# Patient Record
Sex: FEMALE | Race: BLACK OR AFRICAN AMERICAN | NOT HISPANIC OR LATINO
[De-identification: names, ages, dates, MRNs, and addresses within clinical notes are randomized per-mention and may not be internally consistent; named-entity substitution may affect disease eponyms.]

---

## 2018-07-23 ENCOUNTER — RESULT REVIEW (OUTPATIENT)
Age: 58
End: 2018-07-23

## 2019-01-27 ENCOUNTER — RESULT REVIEW (OUTPATIENT)
Age: 59
End: 2019-01-27

## 2019-01-28 ENCOUNTER — RESULT REVIEW (OUTPATIENT)
Age: 59
End: 2019-01-28

## 2019-07-29 ENCOUNTER — RESULT REVIEW (OUTPATIENT)
Age: 59
End: 2019-07-29

## 2020-01-02 ENCOUNTER — TRANSCRIPTION ENCOUNTER (OUTPATIENT)
Age: 60
End: 2020-01-02

## 2020-01-03 ENCOUNTER — APPOINTMENT (OUTPATIENT)
Dept: ORTHOPEDIC SURGERY | Facility: CLINIC | Age: 60
End: 2020-01-03
Payer: COMMERCIAL

## 2020-01-03 VITALS — HEIGHT: 63 IN | WEIGHT: 138 LBS | BODY MASS INDEX: 24.45 KG/M2

## 2020-01-03 DIAGNOSIS — M25.562 PAIN IN LEFT KNEE: ICD-10-CM

## 2020-01-03 DIAGNOSIS — M23.307 OTHER MENISCUS DERANGEMENTS, UNSPECIFIED MENISCUS, LEFT KNEE: ICD-10-CM

## 2020-01-03 DIAGNOSIS — M17.12 UNILATERAL PRIMARY OSTEOARTHRITIS, LEFT KNEE: ICD-10-CM

## 2020-01-03 DIAGNOSIS — M25.462 EFFUSION, LEFT KNEE: ICD-10-CM

## 2020-01-03 PROCEDURE — 20610 DRAIN/INJ JOINT/BURSA W/O US: CPT | Mod: LT

## 2020-01-03 PROCEDURE — 73562 X-RAY EXAM OF KNEE 3: CPT | Mod: LT

## 2020-01-03 PROCEDURE — 99204 OFFICE O/P NEW MOD 45 MIN: CPT | Mod: 25

## 2020-01-04 ENCOUNTER — INPATIENT (INPATIENT)
Facility: HOSPITAL | Age: 60
LOS: 0 days | Discharge: HOME | End: 2020-01-05
Attending: INTERNAL MEDICINE | Admitting: INTERNAL MEDICINE
Payer: COMMERCIAL

## 2020-01-04 VITALS
RESPIRATION RATE: 20 BRPM | DIASTOLIC BLOOD PRESSURE: 84 MMHG | WEIGHT: 139.99 LBS | OXYGEN SATURATION: 100 % | TEMPERATURE: 98 F | HEART RATE: 85 BPM | HEIGHT: 63 IN | SYSTOLIC BLOOD PRESSURE: 135 MMHG

## 2020-01-04 DIAGNOSIS — M19.91 PRIMARY OSTEOARTHRITIS, UNSPECIFIED SITE: ICD-10-CM

## 2020-01-04 DIAGNOSIS — R79.89 OTHER SPECIFIED ABNORMAL FINDINGS OF BLOOD CHEMISTRY: ICD-10-CM

## 2020-01-04 DIAGNOSIS — I20.0 UNSTABLE ANGINA: ICD-10-CM

## 2020-01-04 DIAGNOSIS — R42 DIZZINESS AND GIDDINESS: ICD-10-CM

## 2020-01-04 DIAGNOSIS — Z29.9 ENCOUNTER FOR PROPHYLACTIC MEASURES, UNSPECIFIED: ICD-10-CM

## 2020-01-04 LAB
ALBUMIN SERPL ELPH-MCNC: 3.8 G/DL — SIGNIFICANT CHANGE UP (ref 3.3–5)
ALP SERPL-CCNC: 101 U/L — SIGNIFICANT CHANGE UP (ref 40–120)
ALT FLD-CCNC: 22 U/L — SIGNIFICANT CHANGE UP (ref 12–78)
ANION GAP SERPL CALC-SCNC: 8 MMOL/L — SIGNIFICANT CHANGE UP (ref 5–17)
APTT BLD: 23.7 SEC — LOW (ref 27.5–36.3)
AST SERPL-CCNC: 18 U/L — SIGNIFICANT CHANGE UP (ref 15–37)
BILIRUB SERPL-MCNC: 0.2 MG/DL — SIGNIFICANT CHANGE UP (ref 0.2–1.2)
BUN SERPL-MCNC: 17 MG/DL — SIGNIFICANT CHANGE UP (ref 7–23)
CALCIUM SERPL-MCNC: 9.9 MG/DL — SIGNIFICANT CHANGE UP (ref 8.5–10.1)
CHLORIDE SERPL-SCNC: 105 MMOL/L — SIGNIFICANT CHANGE UP (ref 96–108)
CHOLEST SERPL-MCNC: 226 MG/DL — HIGH (ref 10–199)
CO2 SERPL-SCNC: 26 MMOL/L — SIGNIFICANT CHANGE UP (ref 22–31)
CREAT SERPL-MCNC: 1.03 MG/DL — SIGNIFICANT CHANGE UP (ref 0.5–1.3)
D DIMER BLD IA.RAPID-MCNC: <150 NG/ML DDU — SIGNIFICANT CHANGE UP
GLUCOSE SERPL-MCNC: 151 MG/DL — HIGH (ref 70–99)
HCT VFR BLD CALC: 38.4 % — SIGNIFICANT CHANGE UP (ref 34.5–45)
HDLC SERPL-MCNC: 96 MG/DL — SIGNIFICANT CHANGE UP
HGB BLD-MCNC: 12.7 G/DL — SIGNIFICANT CHANGE UP (ref 11.5–15.5)
INR BLD: 1 RATIO — SIGNIFICANT CHANGE UP (ref 0.88–1.16)
LIPID PNL WITH DIRECT LDL SERPL: 123 MG/DL — HIGH
MCHC RBC-ENTMCNC: 29.1 PG — SIGNIFICANT CHANGE UP (ref 27–34)
MCHC RBC-ENTMCNC: 33.1 GM/DL — SIGNIFICANT CHANGE UP (ref 32–36)
MCV RBC AUTO: 87.9 FL — SIGNIFICANT CHANGE UP (ref 80–100)
NRBC # BLD: 0 /100 WBCS — SIGNIFICANT CHANGE UP (ref 0–0)
NT-PROBNP SERPL-SCNC: 72 PG/ML — SIGNIFICANT CHANGE UP (ref 0–125)
PLATELET # BLD AUTO: 210 K/UL — SIGNIFICANT CHANGE UP (ref 150–400)
POTASSIUM SERPL-MCNC: 3.9 MMOL/L — SIGNIFICANT CHANGE UP (ref 3.5–5.3)
POTASSIUM SERPL-SCNC: 3.9 MMOL/L — SIGNIFICANT CHANGE UP (ref 3.5–5.3)
PROT SERPL-MCNC: 7.8 GM/DL — SIGNIFICANT CHANGE UP (ref 6–8.3)
PROTHROM AB SERPL-ACNC: 11.2 SEC — SIGNIFICANT CHANGE UP (ref 10–12.9)
RBC # BLD: 4.37 M/UL — SIGNIFICANT CHANGE UP (ref 3.8–5.2)
RBC # FLD: 14.5 % — SIGNIFICANT CHANGE UP (ref 10.3–14.5)
SODIUM SERPL-SCNC: 139 MMOL/L — SIGNIFICANT CHANGE UP (ref 135–145)
TOTAL CHOLESTEROL/HDL RATIO MEASUREMENT: 2.4 RATIO — LOW (ref 3.3–7.1)
TRIGL SERPL-MCNC: 36 MG/DL — SIGNIFICANT CHANGE UP (ref 10–149)
TROPONIN I SERPL-MCNC: 0.03 NG/ML — SIGNIFICANT CHANGE UP (ref 0.01–0.04)
TROPONIN I SERPL-MCNC: 0.05 NG/ML — HIGH (ref 0.01–0.04)
TROPONIN I SERPL-MCNC: 0.11 NG/ML — HIGH (ref 0.01–0.04)
WBC # BLD: 9.65 K/UL — SIGNIFICANT CHANGE UP (ref 3.8–10.5)
WBC # FLD AUTO: 9.65 K/UL — SIGNIFICANT CHANGE UP (ref 3.8–10.5)

## 2020-01-04 PROCEDURE — 99222 1ST HOSP IP/OBS MODERATE 55: CPT

## 2020-01-04 PROCEDURE — 99356: CPT

## 2020-01-04 PROCEDURE — 71045 X-RAY EXAM CHEST 1 VIEW: CPT | Mod: 26

## 2020-01-04 PROCEDURE — 93010 ELECTROCARDIOGRAM REPORT: CPT

## 2020-01-04 PROCEDURE — 99285 EMERGENCY DEPT VISIT HI MDM: CPT

## 2020-01-04 PROCEDURE — 99221 1ST HOSP IP/OBS SF/LOW 40: CPT

## 2020-01-04 RX ORDER — AMLODIPINE BESYLATE 2.5 MG/1
10 TABLET ORAL DAILY
Refills: 0 | Status: DISCONTINUED | OUTPATIENT
Start: 2020-01-04 | End: 2020-01-04

## 2020-01-04 RX ORDER — CHOLECALCIFEROL (VITAMIN D3) 125 MCG
1000 CAPSULE ORAL DAILY
Refills: 0 | Status: DISCONTINUED | OUTPATIENT
Start: 2020-01-04 | End: 2020-01-05

## 2020-01-04 RX ORDER — ENOXAPARIN SODIUM 100 MG/ML
40 INJECTION SUBCUTANEOUS DAILY
Refills: 0 | Status: DISCONTINUED | OUTPATIENT
Start: 2020-01-04 | End: 2020-01-04

## 2020-01-04 RX ORDER — MECLIZINE HCL 12.5 MG
12.5 TABLET ORAL EVERY 12 HOURS
Refills: 0 | Status: DISCONTINUED | OUTPATIENT
Start: 2020-01-04 | End: 2020-01-05

## 2020-01-04 RX ORDER — NITROGLYCERIN 6.5 MG
0.4 CAPSULE, EXTENDED RELEASE ORAL
Refills: 0 | Status: DISCONTINUED | OUTPATIENT
Start: 2020-01-04 | End: 2020-01-05

## 2020-01-04 RX ORDER — AMLODIPINE BESYLATE 2.5 MG/1
2.5 TABLET ORAL DAILY
Refills: 0 | Status: DISCONTINUED | OUTPATIENT
Start: 2020-01-04 | End: 2020-01-05

## 2020-01-04 RX ORDER — TRAMADOL HYDROCHLORIDE 50 MG/1
25 TABLET ORAL EVERY 8 HOURS
Refills: 0 | Status: DISCONTINUED | OUTPATIENT
Start: 2020-01-04 | End: 2020-01-05

## 2020-01-04 RX ORDER — ATORVASTATIN CALCIUM 80 MG/1
80 TABLET, FILM COATED ORAL AT BEDTIME
Refills: 0 | Status: DISCONTINUED | OUTPATIENT
Start: 2020-01-04 | End: 2020-01-05

## 2020-01-04 RX ORDER — ENOXAPARIN SODIUM 100 MG/ML
65 INJECTION SUBCUTANEOUS EVERY 12 HOURS
Refills: 0 | Status: DISCONTINUED | OUTPATIENT
Start: 2020-01-04 | End: 2020-01-05

## 2020-01-04 RX ORDER — ASPIRIN/CALCIUM CARB/MAGNESIUM 324 MG
81 TABLET ORAL DAILY
Refills: 0 | Status: DISCONTINUED | OUTPATIENT
Start: 2020-01-05 | End: 2020-01-05

## 2020-01-04 RX ADMIN — ENOXAPARIN SODIUM 65 MILLIGRAM(S): 100 INJECTION SUBCUTANEOUS at 19:28

## 2020-01-04 RX ADMIN — Medication 1000 UNIT(S): at 11:42

## 2020-01-04 RX ADMIN — AMLODIPINE BESYLATE 10 MILLIGRAM(S): 2.5 TABLET ORAL at 06:24

## 2020-01-04 RX ADMIN — ATORVASTATIN CALCIUM 80 MILLIGRAM(S): 80 TABLET, FILM COATED ORAL at 21:17

## 2020-01-04 NOTE — PROGRESS NOTE ADULT - SUBJECTIVE AND OBJECTIVE BOX
Patient is a 59y old  Female who presents with a chief complaint of chest pain, found to have elevated troponin.  I was asked by RN to re-evaluate patient for abnormal troponin level after admission.    INTERVAL HPI/OVERNIGHT EVENTS:  No further chest pain    MEDICATIONS  (STANDING):  amLODIPine   Tablet 2.5 milliGRAM(s) Oral daily  atorvastatin 80 milliGRAM(s) Oral at bedtime  cholecalciferol 1000 Unit(s) Oral daily  enoxaparin Injectable 65 milliGRAM(s) SubCutaneous every 12 hours    MEDICATIONS  (PRN):  meclizine 12.5 milliGRAM(s) Oral every 12 hours PRN Dizziness  nitroglycerin     SubLingual 0.4 milliGRAM(s) SubLingual every 5 minutes PRN Chest Pain  traMADol 25 milliGRAM(s) Oral every 8 hours PRN Moderate Pain (4 - 6)    Allergies:  No Known Allergies    REVIEW OF SYSTEMS:  All other systems reviewed and are negative    Vital Signs Last 24 Hrs  T(C): 36.6 (04 Jan 2020 08:31), Max: 36.6 (04 Jan 2020 08:31)  T(F): 97.9 (04 Jan 2020 08:31), Max: 97.9 (04 Jan 2020 08:31)  HR: 88 (04 Jan 2020 08:31) (85 - 97)  BP: 117/71 (04 Jan 2020 08:31) (117/71 - 135/84)  BP(mean): --  RR: 18 (04 Jan 2020 08:31) (15 - 20)  SpO2: 98% (04 Jan 2020 08:31) (97% - 100%)  Daily Height in cm: 160.02 (04 Jan 2020 02:45)    Daily   I&O's Summary    PHYSICAL EXAM:  GENERAL: NAD, well-groomed, well-developed  HEAD:  Atraumatic, Normocephalic  EYES: EOMI, PERRLA, conjunctiva and sclera clear  ENMT: No tonsillar erythema, exudates, or enlargement; Moist mucous membranes, Good dentition, No lesions  NECK: Supple, No JVD, Normal thyroid  NERVOUS SYSTEM:  Alert & Oriented X3, Good concentration; Motor Strength 5/5 B/L upper and lower extremities; DTRs 2+ intact and symmetric  CHEST/LUNG: Clear to percussion bilaterally; No rales, rhonchi, wheezing, or rubs  HEART: Regular rate and rhythm; No murmurs, rubs, or gallops  ABDOMEN: Soft, Nontender, Nondistended; Bowel sounds present  EXTREMITIES:  2+ Peripheral Pulses, No clubbing, cyanosis, or edema  LYMPH: No lymphadenopathy noted  SKIN: No rashes or lesions    Labs                      12.7   9.65  )-----------( 210      (04 Jan 2020 03:59)             38.4     01-04    139  |  105  |  17  ----------------------------<  151<H>  3.9   |  26  |  1.03    Ca    9.9      04 Jan 2020 03:59    TPro  7.8  /  Alb  3.8  /  TBili  0.2  /  DBili  x   /  AST  18  /  ALT  22  /  AlkPhos  101  01-04    PT/INR - ( 04 Jan 2020 03:59 )   PT: 11.2 sec;   INR: 1.00 ratio    PTT - ( 04 Jan 2020 03:59 )  PTT:23.7 sec    CARDIAC MARKERS ( 04 Jan 2020 09:44 )  .113 ng/mL / x     / x     / x     / x      CARDIAC MARKERS ( 04 Jan 2020 03:59 )  .048 ng/mL / x     / x     / x     / x        < from: Xray Chest 1 View-PORTABLE IMMEDIATE (01.04.20 @ 03:34) >  IMPRESSION:    Mild cardiomegaly. No acute infiltrate.        DVT prophylaxis: > Lovenox

## 2020-01-04 NOTE — H&P ADULT - HISTORY OF PRESENT ILLNESS
Patient is a 58 YO F with a PMH of HTN, vertigo, knee OA who presents to the ED s/p one episode of CP.  Patient reports she was lying down when she developed substernal nonradiating CP, 10/10 associated with shortness of breath and dizziness.  Patient denies hx of nausea, vomiting, palpitations, diaphoresis, or syncope.  Patient reports that she took 2 baby asa which did not help the pain.  EMS arrived and gave her 3 more ASA and the pain resolved after 15 minutes.  Patient reports no further chest pain and has no current complaints.  On ROS, patient reports dizziness - takes meclizine PRN and knee OA - tramadol PRN.  Nonsmoker, NKDA, nondrinker.

## 2020-01-04 NOTE — PROGRESS NOTE ADULT - ASSESSMENT
59F with severe substernal cp relieved with aspirin.  Labs show mild troponin leak.  EKG nsr at 65 with no acute changes.

## 2020-01-04 NOTE — ED ADULT TRIAGE NOTE - CHIEF COMPLAINT QUOTE
BIBA C/O Chest pain , midle of the chest, radiating to mid back , onset 0200 am today .   PMH :Asthma; Vertigo; HTN , Herniated disc

## 2020-01-04 NOTE — H&P ADULT - NSHPLABSRESULTS_GEN_ALL_CORE
Recent Vitals  T(C): 36.4 (01-04-20 @ 02:45), Max: 36.4 (01-04-20 @ 02:45)  HR: 85 (01-04-20 @ 02:45) (85 - 85)  BP: 135/84 (01-04-20 @ 02:45) (135/84 - 135/84)  RR: 20 (01-04-20 @ 02:45) (20 - 20)  SpO2: 100% (01-04-20 @ 02:45) (100% - 100%)                        12.7   9.65  )-----------( 210      ( 04 Jan 2020 03:59 )             38.4     01-04    139  |  105  |  17  ----------------------------<  151<H>  3.9   |  26  |  1.03    Ca    9.9      04 Jan 2020 03:59    TPro  7.8  /  Alb  3.8  /  TBili  0.2  /  DBili  x   /  AST  18  /  ALT  22  /  AlkPhos  101  01-04    PT/INR - ( 04 Jan 2020 03:59 )   PT: 11.2 sec;   INR: 1.00 ratio         PTT - ( 04 Jan 2020 03:59 )  PTT:23.7 sec  LIVER FUNCTIONS - ( 04 Jan 2020 03:59 )  Alb: 3.8 g/dL / Pro: 7.8 gm/dL / ALK PHOS: 101 U/L / ALT: 22 U/L / AST: 18 U/L / GGT: x               Home Medications:  Norvasc 2.5 mg oral tablet: 1 tab(s) orally once a day (13 Feb 2014 16:53)

## 2020-01-04 NOTE — H&P ADULT - ASSESSMENT
59F with severe substernal cp relieved with aspirin.  Labs show mild troponin leak.  EKG nsr at 65 with no acute changes.  Will admit to tele for troponin trending, cardio eval.    IMPROVE VTE Individual Risk Assessment          RISK                                                          Points  [  ] Previous VTE                                                3  [  ] Thrombophilia                                             2  [  ] Lower limb paralysis                                    2        (unable to hold up >15 seconds)    [  ] Current Cancer                                             2         (within 6 months)  [  ] Immobilization > 24 hrs                              1  [  ] ICU/CCU stay > 24 hours                            1  [  ] Age > 60                                                    1    IMPROVE VTE Score - 1

## 2020-01-04 NOTE — H&P ADULT - NSHPPHYSICALEXAM_GEN_ALL_CORE
Physical exam:  General: patient in no acute distress, resting comfortably  Cardio: S1/S2 +ve, regular rate and rhythm, no M/G/R  Resp: clear to ausculation bilaterally, no rales or wheezes  GI: abdomen soft, nontender, non distended, no guarding, BS +ve x 4  Ext: no significant pedal edema  Neuro: CN 2-12 intact, no significant motor or sensory deficits.

## 2020-01-04 NOTE — ED PROVIDER NOTE - OBJECTIVE STATEMENT
Pt is a 58 yo lady with a pmhx of HTN, HL, asthma who presents to the ED with chest pain. It started around 2 AM, was sharp, and is improving now. No cough, no fevers. No abdominal pain. Pain went to her back. No weakness, no sob, no pleuritic pain. No hx of dvt/pe. Pain is improving now. Has had stress test several years ago which was negative. Pt is a 60 yo lady with a pmhx of HTN, HL, asthma who presents to the ED with chest pain. It started around 2 AM, was sharp, and is improving now. No cough, no fevers. No abdominal pain. Pain went to her back. No weakness, no sob, no pleuritic pain. No hx of dvt/pe. Pain is improving now. Has had stress test several years ago which was negative. Pt took 2 81 mg aspirin, and was given 3 more by EMS, and pain resolved.

## 2020-01-04 NOTE — ED PROVIDER NOTE - CLINICAL SUMMARY MEDICAL DECISION MAKING FREE TEXT BOX
Ddx: Acs, heart score 2/ no tearing pain, has equal bp bilaterally, does not suggest dissection/ no sob to suggest PE, perc negative  Plan: Cbc, cmp, bnp, ddimer, cxr, ecg, pt declines pain meds at this time.

## 2020-01-04 NOTE — PROGRESS NOTE ADULT - PROBLEM SELECTOR PLAN 1
Nitroglycerine 0.4mg po q5min x 3 doses maximum PRN for recurrent chest pain  Start Lipitor 80 mg qhs  Start Lovenox 65 mg q12h  Check lipid panel  Repeat Troponin in 6 hours  Cardio eval pending  Echo ordered

## 2020-01-04 NOTE — ED ADULT NURSE NOTE - OBJECTIVE STATEMENT
PW with mid chest pain that radiates to R back today. PT took aspirin yesterday AM and given 3 tabs aspirin by EMS prior to arrival in ED w/ slight improvement. Pt denies headache, SOB, blurry vison.

## 2020-01-04 NOTE — ED PROVIDER NOTE - PROGRESS NOTE DETAILS
Pt is chest pain free. Troponin slightly elevated, ddimer negative. Pt admitted for further management.

## 2020-01-04 NOTE — H&P ADULT - PROBLEM SELECTOR PLAN 1
Nitroglycerine 0.4mg po q5min x 3 doses maximum PRN for recurrent chest pain  ASA 325mg po given in the field.  Will send lipid panel.    Troponin level x 2 q 3 hrs    TTE in am.  Cardio eval in am

## 2020-01-04 NOTE — CONSULT NOTE ADULT - SUBJECTIVE AND OBJECTIVE BOX
HPI:  HPI:  Patient is a 60 YO F with a PMH of HTN, vertigo, knee OA who presents to the ED s/p one episode of CP.  Patient reports she was lying down when she developed substernal nonradiating CP, 10/10 associated with shortness of breath and dizziness.  Patient denies hx of nausea, vomiting, palpitations, diaphoresis, or syncope.  Patient reports that she took 2 baby asa which did not help the pain.  EMS arrived and gave her 3 more ASA and the pain resolved after 15 minutes.  Patient reports no further chest pain and has no current complaints.  On ROS, patient reports dizziness - takes meclizine PRN and knee OA - tramadol PRN.  Nonsmoker, NKDA, nondrinker. (2020 05:42)      Chief Complaint:  Patient is a 59y old  Female who presents with a chief complaint of chest pain (2020 11:52)      Review of Systems:    General:  No wt loss, fevers, chills, night sweats  Eyes:  Good vision, no reported pain  ENT:  No sore throat, pain, runny nose, dysphagia  CV:  No pain, palpitations, hypo/hypertension  Resp:  No dyspnea, cough, tachypnea, wheezing  GI:  No pain, nausea, vomiting, diarrhea, constipation  :  No pain, bleeding, incontinence, nocturia  Muscle:  No pain, weakness  Breast:  No pain, abscess, mass, discharge  Neuro:  No weakness, tingling, memory problems  Psych:  No fatigue, insomnia, mood problems, depression  Endocrine:  No polyuria, polydypsia, cold/heat intolerance  Heme:  No petechiae, ecchymosis, easy bruisability  Skin:  No rash, tattoos, scars, edema         Social History/Family History  SOCHX:   tobacco,  -  alcohol    FMHX: FA/MO  - contributory       Discussed with:  PMD, Family    Physical Exam:    Vital Signs:  Vital Signs Last 24 Hrs  T(C): 36.8 (2020 17:19), Max: 36.8 (2020 16:43)  T(F): 98.3 (2020 17:19), Max: 98.3 (2020 17:19)  HR: 90 (2020 17:19) (85 - 98)  BP: 119/73 (2020 17:19) (117/71 - 135/84)  BP(mean): --  RR: 18 (2020 17:19) (14 - 20)  SpO2: 97% (2020 17:19) (95% - 100%)  Daily Height in cm: 160.02 (2020 17:19)    Daily Weight in k (2020 17:19)  I&O's Summary      General:  Appears stated age, well-groomed, well-nourished, no distress  HEENT:  NC/AT, patent nares w/ pink mucosa, OP clear w/o lesions, PERRL, EOMI, conjunctivae clear, no thyromegaly, nodules, adenopathy, no JVD  Chest:  Full & symmetric excursion, no increased effort, breath sounds clear  Cardiovascular:  Regular rhythm, S1, S2, no murmur/rub/S3/S4, no carotid/femoral/abdominal bruit, radial/pedal pulses 2+, no edema  Abdomen:  Soft, non-tender, non-distended, normoactive bowel sounds, no HSM  Extremities:  Gait & station:   Digits:   Nails:   Joints, Bones, Muscles:   ROM:   Stability:  Skin:  No rash/erythema/ecchymoses/petechiae/wounds/abscess/warm/dry  Musculoskeletal:  Full ROM in all joints w/o swelling/tenderness/effusion  Neuro/Psych:  Alert, oriented, normal and symmetric strength in UEs, LEs, normal gait, sensation intact, affect:   mood:   appearance:       Laboratory:                          12.7   9.65  )-----------( 210      ( 2020 03:59 )             38.4     01-04    139  |  105  |  17  ----------------------------<  151<H>  3.9   |  26  |  1.03    Ca    9.9      2020 03:59    TPro  7.8  /  Alb  3.8  /  TBili  0.2  /  DBili  x   /  AST  18  /  ALT  22  /  AlkPhos  101  01-04      CARDIAC MARKERS ( 2020 18:50 )  .026 ng/mL / x     / x     / x     / x      CARDIAC MARKERS ( 2020 09:44 )  .113 ng/mL / x     / x     / x     / x      CARDIAC MARKERS ( 2020 03:59 )  .048 ng/mL / x     / x     / x     / x          CAPILLARY BLOOD GLUCOSE        LIVER FUNCTIONS - ( 2020 03:59 )  Alb: 3.8 g/dL / Pro: 7.8 gm/dL / ALK PHOS: 101 U/L / ALT: 22 U/L / AST: 18 U/L / GGT: x           PT/INR - ( 2020 03:59 )   PT: 11.2 sec;   INR: 1.00 ratio         PTT - ( 2020 03:59 )  PTT:23.7 sec          Assessment:  I am asked for consultation on this patient with elevated blood pressure, chest pain and a noted mildly elevated troponin  This mildly elevated troponin is not indicative of an acute MI, since also the troponin is now normalized  This likely represents a demand type ischemia  The patient is admitted for telemetry monitoring, a diagnostic echocardiogram  Optimize systolic blood pressure  If diagnostic echocardiogram is normal the patient may be safely discharged and have a stress test as an outpatient

## 2020-01-05 ENCOUNTER — TRANSCRIPTION ENCOUNTER (OUTPATIENT)
Age: 60
End: 2020-01-05

## 2020-01-05 VITALS
RESPIRATION RATE: 16 BRPM | TEMPERATURE: 98 F | SYSTOLIC BLOOD PRESSURE: 112 MMHG | HEART RATE: 77 BPM | OXYGEN SATURATION: 99 % | DIASTOLIC BLOOD PRESSURE: 74 MMHG

## 2020-01-05 LAB
HCV AB S/CO SERPL IA: 0.09 S/CO — SIGNIFICANT CHANGE UP (ref 0–0.99)
HCV AB SERPL-IMP: SIGNIFICANT CHANGE UP
TROPONIN I SERPL-MCNC: <.015 NG/ML — SIGNIFICANT CHANGE UP (ref 0.01–0.04)

## 2020-01-05 PROCEDURE — 99238 HOSP IP/OBS DSCHRG MGMT 30/<: CPT

## 2020-01-05 PROCEDURE — 93306 TTE W/DOPPLER COMPLETE: CPT | Mod: 26

## 2020-01-05 RX ORDER — MECLIZINE HCL 12.5 MG
1 TABLET ORAL
Qty: 0 | Refills: 0 | DISCHARGE
Start: 2020-01-05

## 2020-01-05 RX ORDER — CHOLECALCIFEROL (VITAMIN D3) 125 MCG
1000 CAPSULE ORAL
Qty: 0 | Refills: 0 | DISCHARGE
Start: 2020-01-05

## 2020-01-05 RX ORDER — ASPIRIN/CALCIUM CARB/MAGNESIUM 324 MG
1 TABLET ORAL
Qty: 30 | Refills: 0
Start: 2020-01-05

## 2020-01-05 RX ORDER — NITROGLYCERIN 6.5 MG
1 CAPSULE, EXTENDED RELEASE ORAL
Qty: 60 | Refills: 0
Start: 2020-01-05

## 2020-01-05 RX ORDER — ATORVASTATIN CALCIUM 80 MG/1
1 TABLET, FILM COATED ORAL
Qty: 30 | Refills: 0
Start: 2020-01-05

## 2020-01-05 RX ADMIN — Medication 1000 UNIT(S): at 11:37

## 2020-01-05 RX ADMIN — AMLODIPINE BESYLATE 2.5 MILLIGRAM(S): 2.5 TABLET ORAL at 05:17

## 2020-01-05 RX ADMIN — Medication 81 MILLIGRAM(S): at 11:37

## 2020-01-05 RX ADMIN — ENOXAPARIN SODIUM 65 MILLIGRAM(S): 100 INJECTION SUBCUTANEOUS at 05:17

## 2020-01-05 NOTE — PROGRESS NOTE ADULT - PROBLEM SELECTOR PLAN 1
Continue Nitro, ASA and Lipitor 80 mg qhs  DC Lovenox  Cardiology consult appreciated  Echo results pending

## 2020-01-05 NOTE — DISCHARGE NOTE PROVIDER - NSDCCPCAREPLAN_GEN_ALL_CORE_FT
PRINCIPAL DISCHARGE DIAGNOSIS  Diagnosis: Chest pain, unspecified type  Assessment and Plan of Treatment: likely due to demand ischemia  Stress test as outpatient

## 2020-01-05 NOTE — PROGRESS NOTE ADULT - SUBJECTIVE AND OBJECTIVE BOX
60 YO F with a PMH of HTN, vertigo, knee OA who presents to the ED s/p one episode of CP.  Patient reports she was lying down when she developed substernal nonradiating CP, 10/10 associated with shortness of breath and dizziness.  Patient denies hx of nausea, vomiting, palpitations, diaphoresis, or syncope.  Patient reports that she took 2 baby asa which did not help the pain.  EMS arrived and gave her 3 more ASA and the pain resolved after 15 minutes.  Patient reports no further chest pain and has no current complaints.  On ROS, patient reports dizziness - takes meclizine PRN and knee OA - tramadol PRN.  Nonsmoker, NKDA, nondrinker. (2020 05:42)      Chief Complaint:  Patient is a 59y old  Female who presents with a chief complaint of chest pain (2020 11:52)      Review of Systems:    General:  No wt loss, fevers, chills, night sweats  Eyes:  Good vision, no reported pain  ENT:  No sore throat, pain, runny nose, dysphagia  CV:  No pain, palpitations, hypo/hypertension  Resp:  No dyspnea, cough, tachypnea, wheezing  GI:  No pain, nausea, vomiting, diarrhea, constipation  :  No pain, bleeding, incontinence, nocturia  Muscle:  No pain, weakness  Breast:  No pain, abscess, mass, discharge  Neuro:  No weakness, tingling, memory problems  Psych:  No fatigue, insomnia, mood problems, depression  Endocrine:  No polyuria, polydypsia, cold/heat intolerance  Heme:  No petechiae, ecchymosis, easy bruisability  Skin:  No rash, tattoos, scars, edema         Social History/Family History  SOCHX:   tobacco,  -  alcohol    FMHX: FA/MO  - contributory       Discussed with:  PMD, Family    Physical Exam:    Vital Signs:  Vital Signs Last 24 Hrs  T(C): 36.8 (2020 17:19), Max: 36.8 (2020 16:43)  T(F): 98.3 (2020 17:19), Max: 98.3 (2020 17:19)  HR: 90 (2020 17:19) (85 - 98)  BP: 119/73 (2020 17:19) (117/71 - 135/84)  BP(mean): --  RR: 18 (2020 17:19) (14 - 20)  SpO2: 97% (2020 17:19) (95% - 100%)  Daily Height in cm: 160.02 (2020 17:19)    Daily Weight in k (2020 17:19)  I&O's Summary      General:  Appears stated age, well-groomed, well-nourished, no distress  HEENT:  NC/AT, patent nares w/ pink mucosa, OP clear w/o lesions, PERRL, EOMI, conjunctivae clear, no thyromegaly, nodules, adenopathy, no JVD  Chest:  Full & symmetric excursion, no increased effort, breath sounds clear  Cardiovascular:  Regular rhythm, S1, S2, no murmur/rub/S3/S4, no carotid/femoral/abdominal bruit, radial/pedal pulses 2+, no edema  Abdomen:  Soft, non-tender, non-distended, normoactive bowel sounds, no HSM  Extremities:  Gait & station:   Digits:   Nails:   Joints, Bones, Muscles:   ROM:   Stability:  Skin:  No rash/erythema/ecchymoses/petechiae/wounds/abscess/warm/dry  Musculoskeletal:  Full ROM in all joints w/o swelling/tenderness/effusion  Neuro/Psych:  Alert, oriented, normal and symmetric strength in UEs, LEs, normal gait, sensation intact, affect:   mood:   appearance:       Laboratory:                          12.7   9.65  )-----------( 210      ( 2020 03:59 )             38.4     01-04    139  |  105  |  17  ----------------------------<  151<H>  3.9   |  26  |  1.03    Ca    9.9      2020 03:59    TPro  7.8  /  Alb  3.8  /  TBili  0.2  /  DBili  x   /  AST  18  /  ALT  22  /  AlkPhos  101  01-04      CARDIAC MARKERS ( 2020 18:50 )  .026 ng/mL / x     / x     / x     / x      CARDIAC MARKERS ( 2020 09:44 )  .113 ng/mL / x     / x     / x     / x      CARDIAC MARKERS ( 2020 03:59 )  .048 ng/mL / x     / x     / x     / x          CAPILLARY BLOOD GLUCOSE        LIVER FUNCTIONS - ( 2020 03:59 )  Alb: 3.8 g/dL / Pro: 7.8 gm/dL / ALK PHOS: 101 U/L / ALT: 22 U/L / AST: 18 U/L / GGT: x           PT/INR - ( 2020 03:59 )   PT: 11.2 sec;   INR: 1.00 ratio         PTT - ( 2020 03:59 )  PTT:23.7 sec          Assessment:  I am asked for consultation on this patient with elevated blood pressure, chest pain and a noted mildly elevated troponin  This mildly elevated troponin is not indicative of an acute MI, since also the troponin is now normalized  This likely represents a demand type ischemia  Optimize systolic blood pressure  A diagnostic echocardiogram is normal   discharge outpatient stress test

## 2020-01-05 NOTE — DISCHARGE NOTE PROVIDER - CARE PROVIDER_API CALL
[facility])  Cardiology  [address] [facility], [address] [place] [address]  Phone: [phone]  Fax: [phone]  Established Patient  Follow Up Time: 1 week

## 2020-01-05 NOTE — PROGRESS NOTE ADULT - SUBJECTIVE AND OBJECTIVE BOX
Patient is a 59y old  Female who presents with a chief complaint of chest pain, found to have elevated troponin.    INTERVAL HPI/OVERNIGHT EVENTS:  No further chest pain, feels fine    MEDICATIONS  (STANDING):  amLODIPine   Tablet 2.5 milliGRAM(s) Oral daily  aspirin enteric coated 81 milliGRAM(s) Oral daily  atorvastatin 80 milliGRAM(s) Oral at bedtime  cholecalciferol 1000 Unit(s) Oral daily    MEDICATIONS  (PRN):  meclizine 12.5 milliGRAM(s) Oral every 12 hours PRN Dizziness  nitroglycerin     SubLingual 0.4 milliGRAM(s) SubLingual every 5 minutes PRN Chest Pain  traMADol 25 milliGRAM(s) Oral every 8 hours PRN Moderate Pain (4 - 6)    Allergies:  No Known Allergies    REVIEW OF SYSTEMS:  All other systems reviewed and are negative    Vital Signs Last 24 Hrs  T(C): 36.7 (05 Jan 2020 12:11), Max: 36.8 (04 Jan 2020 16:43)  T(F): 98 (05 Jan 2020 12:11), Max: 98.3 (04 Jan 2020 17:19)  HR: 77 (05 Jan 2020 12:11) (71 - 98)  BP: 112/74 (05 Jan 2020 12:11) (112/74 - 132/80)  BP(mean): --  RR: 16 (05 Jan 2020 12:11) (16 - 19)  SpO2: 99% (05 Jan 2020 12:11) (97% - 99%)    PHYSICAL EXAM:  GENERAL: NAD, well-groomed, well-developed  HEAD:  Atraumatic, Normocephalic  EYES: EOMI, PERRLA, conjunctiva and sclera clear  ENMT: No tonsillar erythema, exudates, or enlargement; Moist mucous membranes, Good dentition, No lesions  NECK: Supple, No JVD, Normal thyroid  NERVOUS SYSTEM:  Alert & Oriented X3, Good concentration; Motor Strength 5/5 B/L upper and lower extremities; DTRs 2+ intact and symmetric  CHEST/LUNG: Clear to percussion bilaterally; No rales, rhonchi, wheezing, or rubs  HEART: Regular rate and rhythm; No murmurs, rubs, or gallops  ABDOMEN: Soft, Nontender, Nondistended; Bowel sounds present  EXTREMITIES:  2+ Peripheral Pulses, No clubbing, cyanosis, or edema  LYMPH: No lymphadenopathy noted  SKIN: No rashes or lesions    Labs                      12.7   9.65  )-----------( 210      ( 04 Jan 2020 03:59 )             38.4     01-04    139  |  105  |  17  ----------------------------<  151<H>  3.9   |  26  |  1.03    Ca    9.9      04 Jan 2020 03:59    TPro  7.8  /  Alb  3.8  /  TBili  0.2  /  DBili  x   /  AST  18  /  ALT  22  /  AlkPhos  101  01-04    PT/INR - ( 04 Jan 2020 03:59 )   PT: 11.2 sec;   INR: 1.00 ratio    PTT - ( 04 Jan 2020 03:59 )  PTT:23.7 sec    CARDIAC MARKERS ( 05 Jan 2020 06:26 )  <.015 ng/mL / x     / x     / x     / x      CARDIAC MARKERS ( 04 Jan 2020 18:50 )  .026 ng/mL / x     / x     / x     / x      CARDIAC MARKERS ( 04 Jan 2020 09:44 )  .113 ng/mL / x     / x     / x     / x      CARDIAC MARKERS ( 04 Jan 2020 03:59 )  .048 ng/mL / x     / x     / x     / x          < from: Xray Chest 1 View-PORTABLE IMMEDIATE (01.04.20 @ 03:34) >  IMPRESSION:    Mild cardiomegaly. No acute infiltrate.        DVT prophylaxis: ambulatory, low risk

## 2020-01-05 NOTE — DISCHARGE NOTE PROVIDER - NSDCMRMEDTOKEN_GEN_ALL_CORE_FT
aspirin 81 mg oral delayed release tablet: 1 tab(s) orally once a day  atorvastatin 80 mg oral tablet: 1 tab(s) orally once a day (at bedtime)  cholecalciferol oral tablet: 1000 unit(s) orally once a day  meclizine 12.5 mg oral tablet: 1 tab(s) orally every 12 hours, As needed, Dizziness  nitroglycerin 0.3 mg sublingual tablet: 1 tab(s) sublingually every 5 minutes, As Needed -for chest pain   Max 3 doses in 15 minutes  Norvasc 2.5 mg oral tablet: 1 tab(s) orally once a day

## 2020-01-05 NOTE — PROGRESS NOTE ADULT - PROBLEM SELECTOR PLAN 2
likely demand ischemia  Troponin trended down to normal  No current chest pain  Outpatient stress test

## 2020-01-05 NOTE — DISCHARGE NOTE PROVIDER - HOSPITAL COURSE
60 YO F with a PMH of HTN, vertigo, knee OA who presents to the ED s/p one episode of CP.  Patient reports she was lying down when she developed substernal nonradiating CP, 10/10 associated with shortness of breath and dizziness.  Patient denies hx of nausea, vomiting, palpitations, diaphoresis, or syncope.  Patient reports that she took 2 baby asa which did not help the pain.  EMS arrived and gave her 3 more ASA and the pain resolved after 15 minutes.  Patient reports no further chest pain and has no current complaints.    Had mild troponin leak which normalized.  Seen by Cardiology and cleared for discharge.        DX:  Chest pain and mild troponin elevation likely due to demand ischemia    HTN    Vertigo        Labs                        12.7     9.65  )-----------( 210      ( 04 Jan 2020 03:59 )               38.4         01-04        139  |  105  |  17    ----------------------------<  151<H>    3.9   |  26  |  1.03        Ca    9.9      04 Jan 2020 03:59        TPro  7.8  /  Alb  3.8  /  TBili  0.2  /  DBili  x   /  AST  18  /  ALT  22  /  AlkPhos  101  01-04        PT/INR - ( 04 Jan 2020 03:59 )   PT: 11.2 sec;   INR: 1.00 ratio      PTT - ( 04 Jan 2020 03:59 )  PTT:23.7 sec        CARDIAC MARKERS ( 05 Jan 2020 06:26 )    <.015 ng/mL / x     / x     / x     / x        CARDIAC MARKERS ( 04 Jan 2020 18:50 )    .026 ng/mL / x     / x     / x     / x        CARDIAC MARKERS ( 04 Jan 2020 09:44 )    .113 ng/mL / x     / x     / x     / x        CARDIAC MARKERS ( 04 Jan 2020 03:59 )    .048 ng/mL / x     / x     / x     / x            < from: Xray Chest 1 View-PORTABLE IMMEDIATE (01.04.20 @ 03:34) >        IMPRESSION:        Mild cardiomegaly. No acute infiltrate.        Echo (prelim):  Normal as per Cardiology

## 2020-01-05 NOTE — DISCHARGE NOTE NURSING/CASE MANAGEMENT/SOCIAL WORK - PATIENT PORTAL LINK FT
You can access the FollowMyHealth Patient Portal offered by [facility] by registering at the following website: [email or web address]. By joining [email or web address]’s FollowMyHealth portal, you will also be able to view your health information using other applications (apps) compatible with our system.

## 2020-01-08 DIAGNOSIS — I24.8 OTHER FORMS OF ACUTE ISCHEMIC HEART DISEASE: ICD-10-CM

## 2020-01-08 DIAGNOSIS — R07.9 CHEST PAIN, UNSPECIFIED: ICD-10-CM

## 2020-01-08 DIAGNOSIS — M17.9 OSTEOARTHRITIS OF KNEE, UNSPECIFIED: ICD-10-CM

## 2020-01-08 DIAGNOSIS — I10 ESSENTIAL (PRIMARY) HYPERTENSION: ICD-10-CM

## 2020-01-08 DIAGNOSIS — I51.7 CARDIOMEGALY: ICD-10-CM

## 2020-01-08 DIAGNOSIS — R42 DIZZINESS AND GIDDINESS: ICD-10-CM

## 2020-01-08 DIAGNOSIS — Z79.899 OTHER LONG TERM (CURRENT) DRUG THERAPY: ICD-10-CM

## 2020-07-27 ENCOUNTER — RESULT REVIEW (OUTPATIENT)
Age: 60
End: 2020-07-27

## 2021-08-02 ENCOUNTER — RESULT REVIEW (OUTPATIENT)
Age: 61
End: 2021-08-02

## 2021-12-16 NOTE — PROVIDER CONTACT NOTE (CRITICAL VALUE NOTIFICATION) - NS PROVIDER READ BACK TO LAB
yes Nasal Turnover Hinge Flap Text: The defect edges were debeveled with a #15 scalpel blade.  Given the size, depth, location of the defect and the defect being full thickness a nasal turnover hinge flap was deemed most appropriate.  Using a sterile surgical marker, an appropriate hinge flap was drawn incorporating the defect. The area thus outlined was incised with a #15 scalpel blade. The flap was designed to recreate the nasal mucosal lining and the alar rim. The skin margins were undermined to an appropriate distance in all directions utilizing iris scissors.

## 2021-12-30 ENCOUNTER — TRANSCRIPTION ENCOUNTER (OUTPATIENT)
Age: 61
End: 2021-12-30

## 2022-06-20 ENCOUNTER — APPOINTMENT (OUTPATIENT)
Dept: ORTHOPEDIC SURGERY | Facility: CLINIC | Age: 62
End: 2022-06-20
Payer: COMMERCIAL

## 2022-06-20 VITALS — BODY MASS INDEX: 25.52 KG/M2 | HEIGHT: 63 IN | WEIGHT: 144 LBS

## 2022-06-20 DIAGNOSIS — I10 ESSENTIAL (PRIMARY) HYPERTENSION: ICD-10-CM

## 2022-06-20 DIAGNOSIS — E11.9 TYPE 2 DIABETES MELLITUS W/OUT COMPLICATIONS: ICD-10-CM

## 2022-06-20 PROCEDURE — J3490M: CUSTOM

## 2022-06-20 PROCEDURE — 99203 OFFICE O/P NEW LOW 30 MIN: CPT | Mod: 25

## 2022-06-20 PROCEDURE — 20550 NJX 1 TENDON SHEATH/LIGAMENT: CPT

## 2022-06-20 NOTE — PHYSICAL EXAM
[de-identified] : R hand: \par Mild swelling \par Tender 1st A1 pulley \par Decreased thumb ROM \par +thumb triggering\par \par L hand: \par Mild swelling \par Tender 1st A1 pulley \par Decreased thumb ROM \par +thumb triggering\par \par

## 2022-06-20 NOTE — ASSESSMENT
[FreeTextEntry1] : bilateal Trigger finger injection was performed because of pain inflammation and stiffness\par Anesthesia: ethyl chloride sprayed topically\par Celestone: An injection of Celestone 1cc\par Lidocaine: An injection of Lidocaine 1% 1cc\par Marcaine: An injection of Marcaine 0.5% 1cc\par x2\par \par Patient has tried OTC's including aspirin, Ibuprofen, Aleve etc or prescription NSAIDS, and/or exercises at home and/ or\par physical therapy without satisfactory response.\par After verbal consent using sterile preparation and technique. The risks, benefits, and alternatives to cortisone injection\par were explained in full to the patient. Risks outlined include but are not limited to infection, sepsis, bleeding, scarring, skin\par discoloration, temporary increase in pain, syncopal episode, failure to resolve symptoms, allergic reaction, symptom\par recurrence, and elevation of blood sugar in diabetics. Patient understood the risks. All questions were answered. After\par discussion of options, patient requested an injection. Oral informed consent was obtained and sterile prep was done of the\par injection site. Sterile technique was utilized for the procedure including the preparation of the solutions used for the\par injection. Patient tolerated the procedure well. Advised to ice the injection site this evening.\par Prep with betadine locally to site. Sterile technique used

## 2022-06-20 NOTE — HISTORY OF PRESENT ILLNESS
[8] : 8 [Dull/Aching] : dull/aching [Ice] : ice [de-identified] : Bilateral trigger thumbs [] : no [FreeTextEntry1] : thumbs [FreeTextEntry3] : May 2022 [FreeTextEntry5] : no injury. has clicking [de-identified] : activity [de-identified] : May 2022 [de-identified] : Sonya CERVANTES

## 2022-08-18 NOTE — PROGRESS NOTE ADULT - I WAS PHYSICALLY PRESENT FOR THE KEY PORTIONS OF THE EVALUATION AND MANAGEMENT (E/M) SERVICE PROVIDED.  I AGREE WITH THE ABOVE HISTORY, PHYSICAL, AND PLAN WHICH I HAVE REVIEWED AND EDITED WHERE APPROPRIATE
[Time Spent: ___ minutes] : I have spent [unfilled] minutes of time on the encounter.
Statement Selected
Statement Selected

## 2022-09-19 ENCOUNTER — APPOINTMENT (OUTPATIENT)
Dept: ORTHOPEDIC SURGERY | Facility: CLINIC | Age: 62
End: 2022-09-19

## 2022-09-19 VITALS — BODY MASS INDEX: 25.52 KG/M2 | WEIGHT: 144 LBS | HEIGHT: 63 IN

## 2022-09-19 PROCEDURE — 99214 OFFICE O/P EST MOD 30 MIN: CPT | Mod: 57

## 2022-09-19 NOTE — HISTORY OF PRESENT ILLNESS
[8] : 8 [Dull/Aching] : dull/aching [Constant] : constant [Full time] : Work status: full time [de-identified] : Bilateral trigger thumb\par Injections in june helped until recently  [] : Post Surgical Visit: no [FreeTextEntry1] : gloria thumbs

## 2022-09-19 NOTE — PHYSICAL EXAM
[de-identified] : R hand: \par Mild swelling \par Tender 1st A1 pulley \par Decreased thumb ROM \par +thumb triggering\par \par L hand: \par Mild swelling \par Tender 1st A1 pulley \par Decreased thumb ROM \par +thumb triggering\par \par
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
## 2022-09-29 ENCOUNTER — APPOINTMENT (OUTPATIENT)
Dept: PAIN MANAGEMENT | Facility: CLINIC | Age: 62
End: 2022-09-29

## 2022-09-29 VITALS — HEIGHT: 63 IN | BODY MASS INDEX: 25.52 KG/M2 | WEIGHT: 144 LBS

## 2022-09-29 PROCEDURE — 99204 OFFICE O/P NEW MOD 45 MIN: CPT

## 2022-09-29 NOTE — HISTORY OF PRESENT ILLNESS
[8] : 8 [2] : 2 [Radiating] : radiating [Sharp] : sharp [Shooting] : shooting [Intermittent] : intermittent [Sleep] : sleep [Meds] : meds [Walking] : walking [de-identified] : pt states she is having pain in the lower back , the pain goes into the left buttocks and down  [] : no [FreeTextEntry7] : into the left side

## 2022-09-29 NOTE — PHYSICAL EXAM
[de-identified] : PHYSICAL EXAM\par \par Constitutional: \par Appears well, no apparent distress\par Ability to communicate: Normal\par Respiratory: non-labored breathing\par Skin: no rash noted\par Head: normocephalic, atraumatic\par Neck: no visible thyroid enlargement\par Eyes: extraocular movements intact\par Neurologic: alert and oriented x3\par Psychiatric: normal mood, affect, and behavior\par \par Lumbar Spine: \par Palpation: left lumbar paraspinal spasm and left lumbar paraspinal tenderness to palpation.\par ROM: Diminished range of motion in all plains.  Patient notes pain with lateral bending to the left.\par MMT: Motor exam is 5/5 through out bilateral lower extremities.\par Sensation: Light touch and pain is intact throughout bilateral lower extremities.\par Reflexes: achilles and patella reflexes are intact and  symmetrical.  No sustained clonus.\par Special Testing: Positive straight leg raise on the left side.\par \par

## 2022-09-30 NOTE — ED PROVIDER NOTE - CPE EDP GASTRO NORM
----- Message from FLORIDA Naylor CNP sent at 9/30/2022 12:20 PM EDT -----  CBC was stable. Her H&H was 12.9/40.7 (was 11.4/36.2 at last check) so its improved. Her urine testing is still pending but so far looks like she may have the start of an infection. I am going to restart her on Macrobid pending the final culture.   Which pharmacy does she want it sent to? normal...

## 2022-10-03 ENCOUNTER — FORM ENCOUNTER (OUTPATIENT)
Age: 62
End: 2022-10-03

## 2022-10-04 ENCOUNTER — APPOINTMENT (OUTPATIENT)
Dept: MRI IMAGING | Facility: CLINIC | Age: 62
End: 2022-10-04

## 2022-10-04 PROCEDURE — 72148 MRI LUMBAR SPINE W/O DYE: CPT

## 2022-10-24 RX ORDER — HYDROCODONE BITARTRATE AND ACETAMINOPHEN 5; 325 MG/1; MG/1
5-325 TABLET ORAL EVERY 4 HOURS
Qty: 10 | Refills: 0 | Status: ACTIVE | COMMUNITY
Start: 2022-10-24 | End: 1900-01-01

## 2022-10-26 ENCOUNTER — APPOINTMENT (OUTPATIENT)
Age: 62
End: 2022-10-26

## 2022-10-26 PROCEDURE — 26055 INCISE FINGER TENDON SHEATH: CPT | Mod: FA

## 2022-10-26 PROCEDURE — 26055 INCISE FINGER TENDON SHEATH: CPT | Mod: AS,FA

## 2022-11-03 ENCOUNTER — APPOINTMENT (OUTPATIENT)
Dept: ORTHOPEDIC SURGERY | Facility: CLINIC | Age: 62
End: 2022-11-03

## 2022-11-03 VITALS — HEIGHT: 63 IN | WEIGHT: 144 LBS | BODY MASS INDEX: 25.52 KG/M2

## 2022-11-03 DIAGNOSIS — M65.311 TRIGGER THUMB, RIGHT THUMB: ICD-10-CM

## 2022-11-03 PROCEDURE — 99024 POSTOP FOLLOW-UP VISIT: CPT

## 2022-11-03 NOTE — PHYSICAL EXAM
[de-identified] : R hand: \par Mild swelling \par Tender 1st A1 pulley \par Decreased thumb ROM \par +thumb triggering\par \par L hand\par Mild hand swelling\par Healed incision\par No evidence of infection\par Mild tenderness at the surgical site\par Good finger ROM \par No triggering

## 2022-11-03 NOTE — HISTORY OF PRESENT ILLNESS
[0] : 0 [] : Post Surgical Visit: yes [de-identified] : L trigger thumb release\par She is better [FreeTextEntry1] : left thumb [de-identified] : 10/26/22 [de-identified] : Left trigger thumb release 36.8

## 2022-11-03 NOTE — ASSESSMENT
[FreeTextEntry1] : Sutures removed\par Steris applied\par LIght activities and advance as tolerated\par \par \par For R trigger thumb release\par R/B/A of surgery discussed with the patient. Risks including but not limited to infection, nerve damage, tendon damage, pain, stiffness, recurrence, no resolution of symptoms, loss of function, limb or life. They understand and agree to surgery \par Return post op

## 2022-11-07 RX ORDER — HYDROCODONE BITARTRATE AND ACETAMINOPHEN 5; 325 MG/1; MG/1
5-325 TABLET ORAL EVERY 4 HOURS
Qty: 10 | Refills: 0 | Status: ACTIVE | COMMUNITY
Start: 2022-11-07 | End: 1900-01-01

## 2022-11-09 ENCOUNTER — APPOINTMENT (OUTPATIENT)
Age: 62
End: 2022-11-09

## 2022-11-09 PROCEDURE — 26055 INCISE FINGER TENDON SHEATH: CPT | Mod: 79,F5

## 2022-11-09 PROCEDURE — 26055 INCISE FINGER TENDON SHEATH: CPT | Mod: AS,79,F5

## 2022-11-17 ENCOUNTER — APPOINTMENT (OUTPATIENT)
Dept: ORTHOPEDIC SURGERY | Facility: CLINIC | Age: 62
End: 2022-11-17
Payer: COMMERCIAL

## 2022-11-17 VITALS — WEIGHT: 144 LBS | BODY MASS INDEX: 25.52 KG/M2 | HEIGHT: 63 IN

## 2022-11-17 DIAGNOSIS — M65.312 TRIGGER THUMB, LEFT THUMB: ICD-10-CM

## 2022-11-17 DIAGNOSIS — M65.332 TRIGGER FINGER, LEFT MIDDLE FINGER: ICD-10-CM

## 2022-11-17 PROCEDURE — J3490M: CUSTOM

## 2022-11-17 PROCEDURE — 99024 POSTOP FOLLOW-UP VISIT: CPT

## 2022-11-17 PROCEDURE — 20550 NJX 1 TENDON SHEATH/LIGAMENT: CPT | Mod: 58,LT

## 2022-11-17 NOTE — HISTORY OF PRESENT ILLNESS
[4] : 4 [2] : 2 [Dull/Aching] : dull/aching [] : Post Surgical Visit: yes [de-identified] : R trigger thumb releease\par Doing well\par \par L MF trigger [FreeTextEntry1] : B/L thumbs [de-identified] : no [de-identified] : 10/26/22 and 11/09/22 [de-identified] : left trigger thumb release and right thumb trigger release

## 2022-11-17 NOTE — PHYSICAL EXAM
[de-identified] : R hand\par Mild hand swelling\par Healed incision\par No evidence of infection\par Mild tenderness at the surgical site\par Good finger ROM \par No trigger\par \par L hand: \par Mild swelling \par Tender 3rd A1 pulley \par Decreased middle ROM \par +middle triggering\par

## 2022-11-17 NOTE — DISCUSSION/SUMMARY
[de-identified] : Trigger finger injection was performed because of pain inflammation and stiffness\par Anesthesia: ethyl chloride sprayed topically\par Celestone: An injection of Celestone 1cc\par Lidocaine: An injection of Lidocaine 1% 1cc\par Marcaine: An injection of Marcaine 0.5% 1cc\par \par Patient has tried OTC's including aspirin, Ibuprofen, Aleve etc or prescription NSAIDS, and/or exercises at home and/ or\par physical therapy without satisfactory response.\par After verbal consent using sterile preparation and technique. The risks, benefits, and alternatives to cortisone injection\par were explained in full to the patient. Risks outlined include but are not limited to infection, sepsis, bleeding, scarring, skin\par discoloration, temporary increase in pain, syncopal episode, failure to resolve symptoms, allergic reaction, symptom\par recurrence, and elevation of blood sugar in diabetics. Patient understood the risks. All questions were answered. After\par discussion of options, patient requested an injection. Oral informed consent was obtained and sterile prep was done of the\par injection site. Sterile technique was utilized for the procedure including the preparation of the solutions used for the\par injection. Patient tolerated the procedure well. Advised to ice the injection site this evening.\par Prep with betadine locally to site. Sterile technique used

## 2022-11-30 ENCOUNTER — APPOINTMENT (OUTPATIENT)
Dept: PAIN MANAGEMENT | Facility: CLINIC | Age: 62
End: 2022-11-30

## 2022-11-30 VITALS — HEIGHT: 63 IN | BODY MASS INDEX: 25.52 KG/M2 | WEIGHT: 144 LBS

## 2022-11-30 DIAGNOSIS — M54.17 RADICULOPATHY, LUMBOSACRAL REGION: ICD-10-CM

## 2022-11-30 PROCEDURE — 99214 OFFICE O/P EST MOD 30 MIN: CPT

## 2022-11-30 NOTE — DISCUSSION/SUMMARY
[de-identified] : Medication Renewal\par \par The patient is stable on current pain medication with analgesia and without notable side effects or any obvious aberrant behaviors exhibited. \par There is clinically meaningful improvement in pain and function that outweighs risks to patient safety. I have discussed risks and realistic benefits of opioid therapy and patient and clinician responsibilities for managing therapy. Pain agreement has been signed. \par Will renew medication today.\par \par Follow up in 4-6 weeks or sooner if there are any problems.\par \par Conservative Care\par Continue Home exercises, stretching, activity modification, physical therapy, and conservative care.\par \par  NO NEW FINDINGS\par Patient denies new numbness, tingling, weakness, fevers, chills and bowel/bladder incontinence\par \par  \par I have consulted the  Registry for the purpose of reviewing the patient's controlled substance\par \par

## 2022-11-30 NOTE — HISTORY OF PRESENT ILLNESS
[Lower back] : lower back [4] : 4 [2] : 2 [Intermittent] : intermittent [Rest] : rest [Standing] : standing [Walking] : walking [de-identified] : pt is following up for mri l spine results  [] : no

## 2022-11-30 NOTE — PHYSICAL EXAM
[de-identified] : PHYSICAL EXAM\par \par Constitutional: \par Appears well, no apparent distress\par Ability to communicate: Normal\par Respiratory: non-labored breathing\par Skin: no rash noted\par Head: normocephalic, atraumatic\par Neck: no visible thyroid enlargement\par Eyes: extraocular movements intact\par Neurologic: alert and oriented x3\par Psychiatric: normal mood, affect, and behavior\par \par \par Back, including spine: Palpation of the thoracic/lumbar spine is as follows: left lumbar paraspinal spasm, left lumbar paraspinal tenderness and left SI joint tenderness. Range of motion of the thoracic and lumbar spine is as follows: Diminished range of motion in all planes. pain with bending to the left and stiffness with bending to the left. Special testing of the thoracic and lumbar spine is as follows: Marga testing is positive for reproduction of pain at the PSIS, and there is a postive Juan Finger test and a positive Gaenslen's test on the left. \par \par Assessemnt:\par Sacroiliac Joint Dysfunction\par \par

## 2022-12-06 ENCOUNTER — NON-APPOINTMENT (OUTPATIENT)
Age: 62
End: 2022-12-06

## 2023-02-13 RX ORDER — GABAPENTIN 300 MG/1
300 CAPSULE ORAL TWICE DAILY
Qty: 60 | Refills: 0 | Status: ACTIVE | COMMUNITY
Start: 2022-11-30 | End: 1900-01-01

## 2023-02-15 ENCOUNTER — APPOINTMENT (OUTPATIENT)
Dept: PAIN MANAGEMENT | Facility: CLINIC | Age: 63
End: 2023-02-15
Payer: COMMERCIAL

## 2023-02-15 VITALS — HEIGHT: 63 IN | WEIGHT: 145 LBS | BODY MASS INDEX: 25.69 KG/M2

## 2023-02-15 DIAGNOSIS — M54.50 LOW BACK PAIN, UNSPECIFIED: ICD-10-CM

## 2023-02-15 DIAGNOSIS — M75.52 BURSITIS OF RIGHT SHOULDER: ICD-10-CM

## 2023-02-15 DIAGNOSIS — G89.29 OTHER CHRONIC PAIN: ICD-10-CM

## 2023-02-15 DIAGNOSIS — M53.3 SACROCOCCYGEAL DISORDERS, NOT ELSEWHERE CLASSIFIED: ICD-10-CM

## 2023-02-15 DIAGNOSIS — M75.51 BURSITIS OF RIGHT SHOULDER: ICD-10-CM

## 2023-02-15 PROCEDURE — 99214 OFFICE O/P EST MOD 30 MIN: CPT

## 2023-02-15 RX ORDER — METHYLPREDNISOLONE 4 MG/1
4 TABLET ORAL
Qty: 21 | Refills: 0 | Status: ACTIVE | COMMUNITY
Start: 2023-02-15 | End: 1900-01-01

## 2023-02-15 RX ORDER — TRAMADOL HYDROCHLORIDE 50 MG/1
50 TABLET, COATED ORAL TWICE DAILY
Qty: 60 | Refills: 0 | Status: ACTIVE | COMMUNITY
Start: 2022-11-30 | End: 1900-01-01

## 2023-02-15 NOTE — PHYSICAL EXAM
[Bilateral] : shoulder bilaterally [] : positive impingement testing [de-identified] : PHYSICAL EXAM\par \par Constitutional: \par Appears well, no apparent distress\par Ability to communicate: Normal\par Respiratory: non-labored breathing\par Skin: no rash noted\par Head: normocephalic, atraumatic\par Neck: no visible thyroid enlargement\par Eyes: extraocular movements intact\par Neurologic: alert and oriented x3\par Psychiatric: normal mood, affect, and behavior\par \par \par Back, including spine: Palpation of the thoracic/lumbar spine is as follows: left lumbar paraspinal spasm, left lumbar paraspinal tenderness and left SI joint tenderness. Range of motion of the thoracic and lumbar spine is as follows: Diminished range of motion in all planes. pain with bending to the left and stiffness with bending to the left. Special testing of the thoracic and lumbar spine is as follows: Marga testing is positive for reproduction of pain at the PSIS, and there is a postive Juan Finger test and a positive Gaenslen's test on the left. \par \par Assessemnt:\par Sacroiliac Joint Dysfunction\par \par

## 2023-02-15 NOTE — ASSESSMENT
[FreeTextEntry1] : pain in bl shoulder  x 2 months, limited ROM\par \par still with pain in lower back/sij- relief with tramadol\par \par \par

## 2023-02-15 NOTE — DISCUSSION/SUMMARY
[Medication Risks Reviewed] : Medication risks reviewed [de-identified] : Medication Renewal\par \par The patient is stable on current pain medication with analgesia and without notable side effects or any obvious aberrant behaviors exhibited. \par There is clinically meaningful improvement in pain and function that outweighs risks to patient safety. I have discussed risks and realistic benefits of opioid therapy and patient and clinician responsibilities for managing therapy. Pain agreement has been signed. \par Will renew medication today.\par \par Follow up in 4-6 weeks or sooner if there are any problems.\par \par Conservative Care\par Continue Home exercises, stretching, activity modification, physical therapy, and conservative care.\par \par  NO NEW FINDINGS\par Patient denies new numbness, tingling, weakness, fevers, chills and bowel/bladder incontinence\par \par  \par I have consulted the  Registry for the purpose of reviewing the patient's controlled substance\par \par UTOX_ last dose 3 days ago \par \par \par Start PT For shoulder pain

## 2023-02-15 NOTE — HISTORY OF PRESENT ILLNESS
[Dull/Aching] : dull/aching [Household chores] : household chores [Leisure] : leisure [Meds] : meds [Sitting] : sitting [Lower back] : lower back [4] : 4 [2] : 2 [Intermittent] : intermittent [Rest] : rest [Standing] : standing [Walking] : walking [] : no [FreeTextEntry7] : buttocks [de-identified] : pt is following up for mri l spine results

## 2023-03-16 ENCOUNTER — APPOINTMENT (OUTPATIENT)
Dept: PAIN MANAGEMENT | Facility: CLINIC | Age: 63
End: 2023-03-16
Payer: COMMERCIAL

## 2023-03-16 VITALS — WEIGHT: 145 LBS | HEIGHT: 63 IN | BODY MASS INDEX: 25.69 KG/M2

## 2023-03-16 PROCEDURE — 99213 OFFICE O/P EST LOW 20 MIN: CPT

## 2023-03-16 NOTE — PHYSICAL EXAM
[de-identified] : PHYSICAL EXAM\par \par Constitutional: \par Appears well, no apparent distress\par Ability to communicate: Normal\par Respiratory: non-labored breathing\par Skin: no rash noted\par Head: normocephalic, atraumatic\par Neck: no visible thyroid enlargement\par Eyes: extraocular movements intact\par Neurologic: alert and oriented x3\par Psychiatric: normal mood, affect, and behavior\par \par \par Back, including spine: Palpation of the thoracic/lumbar spine is as follows: left lumbar paraspinal spasm, left lumbar paraspinal tenderness and left SI joint tenderness. Range of motion of the thoracic and lumbar spine is as follows: Diminished range of motion in all planes. pain with bending to the left and stiffness with bending to the left. Special testing of the thoracic and lumbar spine is as follows: Marga testing is positive for reproduction of pain at the PSIS, and there is a postive Juan Finger test and a positive Gaenslen's test on the left. \par \par Assessemnt:\par Sacroiliac Joint Dysfunction\par \par

## 2023-03-16 NOTE — DISCUSSION/SUMMARY
[de-identified] : Medication Renewal\par \par The patient is stable on current pain medication with analgesia and without notable side effects or any obvious aberrant behaviors exhibited. \par There is clinically meaningful improvement in pain and function that outweighs risks to patient safety. I have discussed risks and realistic benefits of opioid therapy and patient and clinician responsibilities for managing therapy. Pain agreement has been signed. \par Will renew medication today.\par \par Follow up in 4-6 weeks or sooner if there are any problems.\par \par Conservative Care\par Continue Home exercises, stretching, activity modification, physical therapy, and conservative care.\par \par  NO NEW FINDINGS\par Patient denies new numbness, tingling, weakness, fevers, chills and bowel/bladder incontinence\par \par  \par I have consulted the  Registry for the purpose of reviewing the patient's controlled substance\par \par

## 2023-03-16 NOTE — HISTORY OF PRESENT ILLNESS
[Dull/Aching] : dull/aching [Radiating] : radiating [Sleep] : sleep [Meds] : meds [Full time] : Work status: full time [Lower back] : lower back [4] : 4 [2] : 2 [Intermittent] : intermittent [Rest] : rest [Standing] : standing [Walking] : walking [] : no [FreeTextEntry1] : left shoulder  [FreeTextEntry7] : into the left arm [FreeTextEntry9] : tramadol and gabapentin  [de-identified] : pt is following up for mri l spine results

## 2023-03-27 ENCOUNTER — RX RENEWAL (OUTPATIENT)
Age: 63
End: 2023-03-27

## 2023-05-01 ENCOUNTER — APPOINTMENT (OUTPATIENT)
Dept: PAIN MANAGEMENT | Facility: CLINIC | Age: 63
End: 2023-05-01

## 2023-06-06 ENCOUNTER — RX RENEWAL (OUTPATIENT)
Age: 63
End: 2023-06-06

## 2023-06-06 RX ORDER — GABAPENTIN 600 MG/1
600 TABLET, COATED ORAL DAILY
Qty: 30 | Refills: 0 | Status: ACTIVE | COMMUNITY
Start: 2023-02-14 | End: 1900-01-01

## 2023-06-16 NOTE — HISTORY OF PRESENT ILLNESS
[Lower back] : lower back [4] : 4 [2] : 2 [Dull/Aching] : dull/aching [Radiating] : radiating [Intermittent] : intermittent [Sleep] : sleep [Rest] : rest [Meds] : meds [Standing] : standing [Walking] : walking [Full time] : Work status: full time [] : no [FreeTextEntry1] : left shoulder  [FreeTextEntry7] : into the left arm [FreeTextEntry9] : tramadol and gabapentin  [de-identified] : pt is following up for mri l spine results

## 2023-06-19 ENCOUNTER — APPOINTMENT (OUTPATIENT)
Dept: PAIN MANAGEMENT | Facility: CLINIC | Age: 63
End: 2023-06-19

## 2024-12-19 ENCOUNTER — NON-APPOINTMENT (OUTPATIENT)
Age: 64
End: 2024-12-19

## 2025-04-22 ENCOUNTER — NON-APPOINTMENT (OUTPATIENT)
Age: 65
End: 2025-04-22

## 2025-07-03 ENCOUNTER — NON-APPOINTMENT (OUTPATIENT)
Age: 65
End: 2025-07-03

## 2025-07-30 ENCOUNTER — APPOINTMENT (OUTPATIENT)
Dept: NEUROLOGY | Facility: CLINIC | Age: 65
End: 2025-07-30

## 2025-08-26 ENCOUNTER — APPOINTMENT (OUTPATIENT)
Dept: ORTHOPEDIC SURGERY | Facility: CLINIC | Age: 65
End: 2025-08-26
Payer: COMMERCIAL

## 2025-08-26 VITALS — BODY MASS INDEX: 25.69 KG/M2 | WEIGHT: 145 LBS | HEIGHT: 63 IN

## 2025-08-26 DIAGNOSIS — S46.011A STRAIN OF MUSCLE(S) AND TENDON(S) OF THE ROTATOR CUFF OF RIGHT SHOULDER, INITIAL ENCOUNTER: ICD-10-CM

## 2025-08-26 DIAGNOSIS — M19.011 PRIMARY OSTEOARTHRITIS, RIGHT SHOULDER: ICD-10-CM

## 2025-08-26 PROCEDURE — 73030 X-RAY EXAM OF SHOULDER: CPT | Mod: RT

## 2025-08-26 PROCEDURE — 99203 OFFICE O/P NEW LOW 30 MIN: CPT

## 2025-08-26 PROCEDURE — 99213 OFFICE O/P EST LOW 20 MIN: CPT
